# Patient Record
Sex: FEMALE | Race: WHITE | Employment: OTHER | URBAN - METROPOLITAN AREA
[De-identification: names, ages, dates, MRNs, and addresses within clinical notes are randomized per-mention and may not be internally consistent; named-entity substitution may affect disease eponyms.]

---

## 2017-02-14 ENCOUNTER — OFFICE VISIT (OUTPATIENT)
Dept: SURGERY | Facility: CLINIC | Age: 52
End: 2017-02-14

## 2017-02-14 VITALS
HEART RATE: 54 BPM | SYSTOLIC BLOOD PRESSURE: 117 MMHG | HEIGHT: 66.5 IN | WEIGHT: 161 LBS | DIASTOLIC BLOOD PRESSURE: 72 MMHG | BODY MASS INDEX: 25.57 KG/M2 | TEMPERATURE: 98 F

## 2017-02-14 DIAGNOSIS — Z90.13 H/O BILATERAL MASTECTOMY: Primary | ICD-10-CM

## 2017-02-14 PROCEDURE — 99212 OFFICE O/P EST SF 10 MIN: CPT | Performed by: SURGERY

## 2017-02-14 NOTE — PROGRESS NOTES
Michelle Avendaño is a 49year old female who presents today for a follow-up. Since her last visit, the patient underwent bilateral breast MRI. This showed postsurgical changes and no evidence of implant rupture.   The patient continues to complain of an a

## 2019-02-25 ENCOUNTER — TELEPHONE (OUTPATIENT)
Dept: SURGERY | Facility: CLINIC | Age: 54
End: 2019-02-25

## 2019-02-25 NOTE — TELEPHONE ENCOUNTER
I spoke with the patient who called with questions for Dr. Pat Steven-   She relates a surgical history with Dr. Luis Enrique Elizabeth and currently has bilateral breast, textured, implants.    She is anxious to hear the news that these implants are being \"banned in [place]\"

## 2019-04-02 ENCOUNTER — OFFICE VISIT (OUTPATIENT)
Dept: SURGERY | Facility: CLINIC | Age: 54
End: 2019-04-02
Payer: COMMERCIAL

## 2019-04-02 DIAGNOSIS — Z15.09 BRCA1 POSITIVE: Primary | ICD-10-CM

## 2019-04-02 DIAGNOSIS — Z15.01 BRCA1 POSITIVE: Primary | ICD-10-CM

## 2019-04-02 PROCEDURE — 99212 OFFICE O/P EST SF 10 MIN: CPT | Performed by: SURGERY

## 2019-04-02 NOTE — PROGRESS NOTES
Padmaja Duvall is a 47year old female who presents today for a follow-up. She complains of persistent tightness of her left reconstructed breast.  She denies any new masses, skin changes, or nipple discharge.   The patient has multiple questions regardi

## 2019-11-25 ENCOUNTER — TELEPHONE (OUTPATIENT)
Dept: SURGERY | Facility: CLINIC | Age: 54
End: 2019-11-25

## 2019-11-25 NOTE — TELEPHONE ENCOUNTER
Patient calling to see if we can hold a date for her surgery. She is seeing Dr Berkley Novak on 1/7 for a pre-op. Offered to hold a date in March. Patient stated she is going out of country and opted for a date in April instead.  Placing a tentative hold on 4/8 at

## 2020-01-07 ENCOUNTER — OFFICE VISIT (OUTPATIENT)
Dept: SURGERY | Facility: CLINIC | Age: 55
End: 2020-01-07
Payer: COMMERCIAL

## 2020-01-07 DIAGNOSIS — Z01.818 PREOP EXAMINATION: Primary | ICD-10-CM

## 2020-01-07 DIAGNOSIS — Z90.13 H/O BILATERAL MASTECTOMY: ICD-10-CM

## 2020-01-07 PROCEDURE — 99212 OFFICE O/P EST SF 10 MIN: CPT | Performed by: SURGERY

## 2020-01-07 NOTE — PATIENT INSTRUCTIONS
Surgeon:         Dr. Brenda Skiff                                        Tel:        [phone]                                  Fax:        [phone]    Surgery/Procedure:     Bilateral breast implant exchange, possible capsulectomy/capsulotomy of th

## 2020-01-07 NOTE — PROGRESS NOTES
Rell Phelan is a 47year old female who presents today for a follow-up. She complains of persistent tightness of her left reconstructed breast.  She would like to proceed with revision surgery.  She denies any new masses, skin changes, or nipple disch to proceed.

## 2020-01-20 ENCOUNTER — TELEPHONE (OUTPATIENT)
Dept: SURGERY | Facility: CLINIC | Age: 55
End: 2020-01-20

## 2020-01-20 DIAGNOSIS — Z90.13 H/O BILATERAL MASTECTOMY: Primary | ICD-10-CM

## 2020-01-20 NOTE — TELEPHONE ENCOUNTER
Returning pt's call, she stated that she is scheduled for 4/8/2020 with Dr. Bud Mari and wanted to make sure everything was authorized with her insurance.  I let her know that I wouldn't start her auth until the month before and that if there were any issues

## 2020-03-16 PROBLEM — R00.1 SINUS BRADYCARDIA: Status: ACTIVE | Noted: 2020-03-16

## 2020-03-19 ENCOUNTER — TELEPHONE (OUTPATIENT)
Dept: SURGERY | Facility: CLINIC | Age: 55
End: 2020-03-19

## 2020-03-19 DIAGNOSIS — Z15.09 BRCA1 POSITIVE: ICD-10-CM

## 2020-03-19 DIAGNOSIS — Z15.01 BRCA1 POSITIVE: ICD-10-CM

## 2020-03-19 DIAGNOSIS — Z90.13 H/O BILATERAL MASTECTOMY: Primary | ICD-10-CM

## 2020-03-19 NOTE — TELEPHONE ENCOUNTER
Called patient to inform her we must cancel her upcoming surgery due to COVID-19 restrictions. Informed patient we are still unsure as to when we can resume scheduling elective cases but will contact when we have more information.  Patient verbalized under

## 2020-06-03 ENCOUNTER — TELEPHONE (OUTPATIENT)
Dept: SURGERY | Facility: CLINIC | Age: 55
End: 2020-06-03

## 2020-06-03 DIAGNOSIS — Z90.13 STATUS POST BILATERAL MASTECTOMY: Primary | ICD-10-CM

## 2020-06-03 NOTE — TELEPHONE ENCOUNTER
Called pt to reschedule her surgery with Dr. Sissy Woods, pt wanted to make sure it was after her daughter started college. We confirmed 8/27/2020 at [facility]  Pt had questions regarding clearance for which I told her I would have the RN take a loo

## 2020-06-04 ENCOUNTER — TELEPHONE (OUTPATIENT)
Dept: SURGERY | Facility: CLINIC | Age: 55
End: 2020-06-04

## 2020-06-04 NOTE — TELEPHONE ENCOUNTER
Called patient after receiving a message stating she had pre-op clearance testing questions. I informed her that her EKG was normal and within the 6 month time frame of her surgery so she does not need to repeat.  She will need a new medical clearance and l

## 2020-08-24 ENCOUNTER — APPOINTMENT (OUTPATIENT)
Dept: LAB | Facility: HOSPITAL | Age: 55
End: 2020-08-24
Attending: SURGERY
Payer: COMMERCIAL

## 2020-08-24 DIAGNOSIS — Z01.818 PRE-OP TESTING: ICD-10-CM

## 2020-08-26 LAB — SARS-COV-2 RNA RESP QL NAA+PROBE: NOT DETECTED

## 2020-08-27 ENCOUNTER — ANESTHESIA (OUTPATIENT)
Dept: SURGERY | Facility: HOSPITAL | Age: 55
End: 2020-08-27
Payer: COMMERCIAL

## 2020-08-27 ENCOUNTER — HOSPITAL ENCOUNTER (OUTPATIENT)
Facility: HOSPITAL | Age: 55
Setting detail: HOSPITAL OUTPATIENT SURGERY
Discharge: HOME | End: 2020-08-27
Attending: SURGERY | Admitting: SURGERY
Payer: COMMERCIAL

## 2020-08-27 ENCOUNTER — ANESTHESIA EVENT (OUTPATIENT)
Dept: SURGERY | Facility: HOSPITAL | Age: 55
End: 2020-08-27
Payer: COMMERCIAL

## 2020-08-27 VITALS
RESPIRATION RATE: 15 BRPM | DIASTOLIC BLOOD PRESSURE: 68 MMHG | OXYGEN SATURATION: 97 % | HEART RATE: 51 BPM | BODY MASS INDEX: 26.33 KG/M2 | HEIGHT: 66 IN | SYSTOLIC BLOOD PRESSURE: 112 MMHG | TEMPERATURE: 98 F | WEIGHT: 163.81 LBS

## 2020-08-27 DIAGNOSIS — Z90.13 STATUS POST BILATERAL MASTECTOMY: ICD-10-CM

## 2020-08-27 DIAGNOSIS — Z01.818 PRE-OP TESTING: Primary | ICD-10-CM

## 2020-08-27 PROCEDURE — 0HQU0ZZ REPAIR LEFT BREAST, OPEN APPROACH: ICD-10-PCS | Performed by: SURGERY

## 2020-08-27 PROCEDURE — 88305 TISSUE EXAM BY PATHOLOGIST: CPT | Performed by: SURGERY

## 2020-08-27 PROCEDURE — 0HRV0JZ REPLACEMENT OF BILATERAL BREAST WITH SYNTHETIC SUBSTITUTE, OPEN APPROACH: ICD-10-PCS | Performed by: SURGERY

## 2020-08-27 DEVICE — IMPLNT BRST INSPIRA FULL 605CC: Type: IMPLANTABLE DEVICE | Site: BREAST | Status: FUNCTIONAL

## 2020-08-27 RX ORDER — HYDROMORPHONE HYDROCHLORIDE 1 MG/ML
INJECTION, SOLUTION INTRAMUSCULAR; INTRAVENOUS; SUBCUTANEOUS
Status: COMPLETED
Start: 2020-08-27 | End: 2020-08-27

## 2020-08-27 RX ORDER — HYDROCODONE BITARTRATE AND ACETAMINOPHEN 5; 325 MG/1; MG/1
1 TABLET ORAL AS NEEDED
Status: DISCONTINUED | OUTPATIENT
Start: 2020-08-27 | End: 2020-08-27

## 2020-08-27 RX ORDER — ONDANSETRON 2 MG/ML
4 INJECTION INTRAMUSCULAR; INTRAVENOUS AS NEEDED
Status: DISCONTINUED | OUTPATIENT
Start: 2020-08-27 | End: 2020-08-27

## 2020-08-27 RX ORDER — LIDOCAINE HYDROCHLORIDE AND EPINEPHRINE 10; 10 MG/ML; UG/ML
INJECTION, SOLUTION INFILTRATION; PERINEURAL AS NEEDED
Status: DISCONTINUED | OUTPATIENT
Start: 2020-08-27 | End: 2020-08-27 | Stop reason: HOSPADM

## 2020-08-27 RX ORDER — ACETAMINOPHEN 500 MG
1000 TABLET ORAL ONCE
Status: DISCONTINUED | OUTPATIENT
Start: 2020-08-27 | End: 2020-08-27 | Stop reason: HOSPADM

## 2020-08-27 RX ORDER — LIDOCAINE HYDROCHLORIDE 10 MG/ML
INJECTION, SOLUTION EPIDURAL; INFILTRATION; INTRACAUDAL; PERINEURAL AS NEEDED
Status: DISCONTINUED | OUTPATIENT
Start: 2020-08-27 | End: 2020-08-27 | Stop reason: SURG

## 2020-08-27 RX ORDER — HYDROCODONE BITARTRATE AND ACETAMINOPHEN 5; 325 MG/1; MG/1
2 TABLET ORAL AS NEEDED
Status: DISCONTINUED | OUTPATIENT
Start: 2020-08-27 | End: 2020-08-27

## 2020-08-27 RX ORDER — ONDANSETRON 4 MG/1
4 TABLET, FILM COATED ORAL EVERY 8 HOURS PRN
Qty: 20 TABLET | Refills: 0 | Status: SHIPPED | OUTPATIENT
Start: 2020-08-27 | End: 2020-09-22 | Stop reason: ALTCHOICE

## 2020-08-27 RX ORDER — SODIUM CHLORIDE, SODIUM LACTATE, POTASSIUM CHLORIDE, CALCIUM CHLORIDE 600; 310; 30; 20 MG/100ML; MG/100ML; MG/100ML; MG/100ML
INJECTION, SOLUTION INTRAVENOUS CONTINUOUS
Status: DISCONTINUED | OUTPATIENT
Start: 2020-08-27 | End: 2020-08-27

## 2020-08-27 RX ORDER — HYDROCODONE BITARTRATE AND ACETAMINOPHEN 5; 325 MG/1; MG/1
1-2 TABLET ORAL EVERY 4 HOURS PRN
Qty: 40 TABLET | Refills: 0 | Status: SHIPPED | OUTPATIENT
Start: 2020-08-27 | End: 2020-09-22 | Stop reason: ALTCHOICE

## 2020-08-27 RX ORDER — ACETAMINOPHEN 500 MG
1000 TABLET ORAL ONCE
COMMUNITY
End: 2021-04-22

## 2020-08-27 RX ORDER — CEPHALEXIN 500 MG/1
500 CAPSULE ORAL 4 TIMES DAILY
Qty: 20 CAPSULE | Refills: 0 | Status: SHIPPED | OUTPATIENT
Start: 2020-08-27 | End: 2020-09-01

## 2020-08-27 RX ORDER — HYDROMORPHONE HYDROCHLORIDE 1 MG/ML
0.4 INJECTION, SOLUTION INTRAMUSCULAR; INTRAVENOUS; SUBCUTANEOUS EVERY 5 MIN PRN
Status: DISCONTINUED | OUTPATIENT
Start: 2020-08-27 | End: 2020-08-27

## 2020-08-27 RX ORDER — CEFAZOLIN SODIUM/WATER 2 G/20 ML
2 SYRINGE (ML) INTRAVENOUS ONCE
Status: COMPLETED | OUTPATIENT
Start: 2020-08-27 | End: 2020-08-27

## 2020-08-27 RX ORDER — NEOSTIGMINE METHYLSULFATE 1 MG/ML
INJECTION INTRAVENOUS AS NEEDED
Status: DISCONTINUED | OUTPATIENT
Start: 2020-08-27 | End: 2020-08-27 | Stop reason: SURG

## 2020-08-27 RX ORDER — ROCURONIUM BROMIDE 10 MG/ML
INJECTION, SOLUTION INTRAVENOUS AS NEEDED
Status: DISCONTINUED | OUTPATIENT
Start: 2020-08-27 | End: 2020-08-27 | Stop reason: SURG

## 2020-08-27 RX ORDER — NALOXONE HYDROCHLORIDE 0.4 MG/ML
80 INJECTION, SOLUTION INTRAMUSCULAR; INTRAVENOUS; SUBCUTANEOUS AS NEEDED
Status: DISCONTINUED | OUTPATIENT
Start: 2020-08-27 | End: 2020-08-27

## 2020-08-27 RX ORDER — METOCLOPRAMIDE HYDROCHLORIDE 5 MG/ML
10 INJECTION INTRAMUSCULAR; INTRAVENOUS AS NEEDED
Status: DISCONTINUED | OUTPATIENT
Start: 2020-08-27 | End: 2020-08-27

## 2020-08-27 RX ORDER — DOCUSATE SODIUM 100 MG/1
100 CAPSULE, LIQUID FILLED ORAL 2 TIMES DAILY
Qty: 40 CAPSULE | Refills: 0 | Status: SHIPPED | OUTPATIENT
Start: 2020-08-27 | End: 2020-09-22 | Stop reason: ALTCHOICE

## 2020-08-27 RX ORDER — GLYCOPYRROLATE 0.2 MG/ML
INJECTION, SOLUTION INTRAMUSCULAR; INTRAVENOUS AS NEEDED
Status: DISCONTINUED | OUTPATIENT
Start: 2020-08-27 | End: 2020-08-27 | Stop reason: SURG

## 2020-08-27 RX ORDER — DEXAMETHASONE SODIUM PHOSPHATE 4 MG/ML
VIAL (ML) INJECTION AS NEEDED
Status: DISCONTINUED | OUTPATIENT
Start: 2020-08-27 | End: 2020-08-27 | Stop reason: SURG

## 2020-08-27 RX ADMIN — DEXAMETHASONE SODIUM PHOSPHATE 4 MG: 4 MG/ML VIAL (ML) INJECTION at 15:56:00

## 2020-08-27 RX ADMIN — SODIUM CHLORIDE, SODIUM LACTATE, POTASSIUM CHLORIDE, CALCIUM CHLORIDE: 600; 310; 30; 20 INJECTION, SOLUTION INTRAVENOUS at 18:02:00

## 2020-08-27 RX ADMIN — LIDOCAINE HYDROCHLORIDE 25 MG: 10 INJECTION, SOLUTION EPIDURAL; INFILTRATION; INTRACAUDAL; PERINEURAL at 15:27:00

## 2020-08-27 RX ADMIN — GLYCOPYRROLATE 0.4 MG: 0.2 INJECTION, SOLUTION INTRAMUSCULAR; INTRAVENOUS at 17:37:00

## 2020-08-27 RX ADMIN — ROCURONIUM BROMIDE 30 MG: 10 INJECTION, SOLUTION INTRAVENOUS at 15:31:00

## 2020-08-27 RX ADMIN — SODIUM CHLORIDE, SODIUM LACTATE, POTASSIUM CHLORIDE, CALCIUM CHLORIDE: 600; 310; 30; 20 INJECTION, SOLUTION INTRAVENOUS at 16:08:00

## 2020-08-27 RX ADMIN — SODIUM CHLORIDE, SODIUM LACTATE, POTASSIUM CHLORIDE, CALCIUM CHLORIDE: 600; 310; 30; 20 INJECTION, SOLUTION INTRAVENOUS at 15:31:00

## 2020-08-27 RX ADMIN — CEFAZOLIN SODIUM/WATER 2 G: 2 G/20 ML SYRINGE (ML) INTRAVENOUS at 15:38:00

## 2020-08-27 RX ADMIN — SODIUM CHLORIDE, SODIUM LACTATE, POTASSIUM CHLORIDE, CALCIUM CHLORIDE: 600; 310; 30; 20 INJECTION, SOLUTION INTRAVENOUS at 17:33:00

## 2020-08-27 RX ADMIN — NEOSTIGMINE METHYLSULFATE 2 MG: 1 INJECTION INTRAVENOUS at 17:37:00

## 2020-08-27 NOTE — ANESTHESIA POSTPROCEDURE EVALUATION
[address] Patient Status:  Hospital Outpatient Surgery   Age/Gender 49year old female MRN [id number]   [facility] SURGERY Attending Mariella Gonzalez MD   Hosp Day # 0 PCP Socorro Bellamy MD       Anesthesia Post-

## 2020-08-27 NOTE — ANESTHESIA PREPROCEDURE EVALUATION
PRE-OP EVALUATION    Patient Name: Crista Campbell    Pre-op Diagnosis: Status post bilateral mastectomy [Z90.13]    Procedure(s):  Bilateral breast implant exchange, possible capsulectomy/capsulotomy of the left breast, possible revision mastopexy of MD at [facility] MAIN OR   • BREAST RECONSTRUCTION SECOND STAGE/ REVISION Bilateral 9/25/2013    Performed by Aly Kerns MD at [facility] MAIN OR   • BREAST RECONSTRUCTION/ IMMEDIATE/EXPANDER Bilateral 5/8/2013    Performed by Aly Kerns MD at [facility]   • ESEQUIEL Admission:  **None**

## 2020-08-27 NOTE — H&P
Dr. Jess De La Fuente 8/18/20 surgical clearance H&P reviewed. No interval changes. Informed consent obtained and she wishes to proceed as planned.

## 2020-08-27 NOTE — BRIEF OP NOTE
Pre-Operative Diagnosis: Status post bilateral mastectomy [Z90.13]     Post-Operative Diagnosis: Status post bilateral mastectomy [Z90.13]      Procedure Performed:   Procedure(s):  Bilateral breast implant exchange, capsulectomy/capsulotomy of the left br

## 2020-08-27 NOTE — ANESTHESIA PROCEDURE NOTES
Airway  Date/Time: 8/27/2020 3:35 PM  Urgency: elective    Airway not difficult    General Information and Staff    Patient location during procedure: OR  Anesthesiologist: Sonia Rao MD  Performed: anesthesiologist     Indications and Patient Condi

## 2020-08-28 NOTE — OPERATIVE REPORT
[facility]    PATIENT'S NAME: Sylvia Pedro   ATTENDING PHYSICIAN: Eliezer Schumacher M.D. OPERATING PHYSICIAN: Eliezer Schumacher M.D.    PATIENT ACCOUNT#:   [de-identified]    LOCATION:  [address] [place] [address] EDW 10  MEDICAL RECORD #:   [id number] lipodystrophy were marked for liposuction. The patient was then taken to the operating room, properly identified, placed in the supine position. Sequential compression devices were placed on bilateral lower extremities.   Intravenous antibiotic prophylaxi superior pole of the implant was overriding the pectoralis muscle rather than below it. The subpectoral pocket was then released with electrocautery superiorly and medially.   Laterally, the capsule was released to the markings that had previously been mad analysis. The wound was then closed in a layered fashion with interrupted 3-0 Vicryl deep sutures and running 4-0 Monocryl subcuticular suture. Dermabond and Steri-Strips were placed on all the incisions.   Fluff gauze and a surgical bra were placed on th

## 2020-09-04 ENCOUNTER — OFFICE VISIT (OUTPATIENT)
Dept: SURGERY | Facility: CLINIC | Age: 55
End: 2020-09-04
Payer: COMMERCIAL

## 2020-09-04 DIAGNOSIS — Z90.13 ABSENCE OF BREAST, BILATERAL: Primary | ICD-10-CM

## 2020-09-04 PROCEDURE — 99024 POSTOP FOLLOW-UP VISIT: CPT | Performed by: PHYSICIAN ASSISTANT

## 2020-09-04 NOTE — PROGRESS NOTES
This is a 41-year-old female that is 8 days status post her bilateral implant exchange with a left capsulotomy and mastopexy, autologous fat grafting to the bilateral reconstructed breasts. She has been compliant with compression.   She has finished her 5-

## 2020-09-22 ENCOUNTER — OFFICE VISIT (OUTPATIENT)
Dept: SURGERY | Facility: CLINIC | Age: 55
End: 2020-09-22
Payer: COMMERCIAL

## 2020-09-22 DIAGNOSIS — Z90.13 ABSENCE OF BREAST, BILATERAL: Primary | ICD-10-CM

## 2020-09-22 PROCEDURE — 99024 POSTOP FOLLOW-UP VISIT: CPT | Performed by: SURGERY

## 2020-09-22 NOTE — PROGRESS NOTES
Lexy Valente is a 49year old female who presents today for a follow-up. She is without new complaints and is pleased with her result. Physical Examination:  Breasts: Bilateral breast incisions are well-healed.   Good shape and symmetry is noted

## 2021-03-16 ENCOUNTER — OFFICE VISIT (OUTPATIENT)
Dept: SURGERY | Facility: CLINIC | Age: 56
End: 2021-03-16
Payer: COMMERCIAL

## 2021-03-16 DIAGNOSIS — Z90.13 ABSENCE OF BREAST, BILATERAL: Primary | ICD-10-CM

## 2021-03-16 PROCEDURE — 99212 OFFICE O/P EST SF 10 MIN: CPT | Performed by: SURGERY

## 2021-09-21 ENCOUNTER — OFFICE VISIT (OUTPATIENT)
Dept: SURGERY | Facility: CLINIC | Age: 56
End: 2021-09-21
Payer: COMMERCIAL

## 2021-09-21 DIAGNOSIS — Z90.13 ABSENCE OF BREAST, BILATERAL: Primary | ICD-10-CM

## 2021-09-21 PROCEDURE — 99212 OFFICE O/P EST SF 10 MIN: CPT | Performed by: SURGERY

## 2021-09-21 NOTE — PROGRESS NOTES
Clara Ordaz is a 48year old female who presents today for a yearly follow-up. She she is without new complaints. Specifically, she denies any masses, skin changes, or nipple discharge.       Physical Examination:  HEENT: There is no cervical or

## 2022-04-19 ENCOUNTER — HOSPITAL ENCOUNTER (OUTPATIENT)
Dept: CV DIAGNOSTICS | Facility: HOSPITAL | Age: 57
Discharge: HOME | End: 2022-04-19
Attending: INTERNAL MEDICINE
Payer: COMMERCIAL

## 2022-04-19 DIAGNOSIS — R00.1 SINUS BRADYCARDIA: ICD-10-CM

## 2022-04-19 PROCEDURE — 93306 TTE W/DOPPLER COMPLETE: CPT | Performed by: INTERNAL MEDICINE

## 2024-03-13 ENCOUNTER — ORDER TRANSCRIPTION (OUTPATIENT)
Dept: ADMINISTRATIVE | Facility: HOSPITAL | Age: 59
End: 2024-03-13

## 2024-03-13 DIAGNOSIS — Z13.6 SCREENING FOR CARDIOVASCULAR CONDITION: Primary | ICD-10-CM

## 2024-05-06 ENCOUNTER — HOSPITAL ENCOUNTER (OUTPATIENT)
Dept: CT IMAGING | Age: 59
Discharge: HOME | End: 2024-05-06
Attending: FAMILY MEDICINE

## 2024-05-06 DIAGNOSIS — Z13.6 SCREENING FOR CARDIOVASCULAR CONDITION: ICD-10-CM

## 2024-08-09 ENCOUNTER — OFFICE VISIT (OUTPATIENT)
Dept: RHEUMATOLOGY | Facility: CLINIC | Age: 59
End: 2024-08-09
Payer: COMMERCIAL

## 2024-08-09 ENCOUNTER — LAB ENCOUNTER (OUTPATIENT)
Dept: LAB | Age: 59
End: 2024-08-09
Attending: INTERNAL MEDICINE
Payer: COMMERCIAL

## 2024-08-09 VITALS
BODY MASS INDEX: 26.74 KG/M2 | HEIGHT: 66 IN | HEART RATE: 95 BPM | DIASTOLIC BLOOD PRESSURE: 80 MMHG | OXYGEN SATURATION: 94 % | WEIGHT: 166.38 LBS | TEMPERATURE: 97 F | RESPIRATION RATE: 16 BRPM | SYSTOLIC BLOOD PRESSURE: 122 MMHG

## 2024-08-09 DIAGNOSIS — R76.8 POSITIVE ANA (ANTINUCLEAR ANTIBODY): Primary | ICD-10-CM

## 2024-08-09 DIAGNOSIS — Z78.0 POST-MENOPAUSAL: ICD-10-CM

## 2024-08-09 DIAGNOSIS — L65.9 ALOPECIA: ICD-10-CM

## 2024-08-09 DIAGNOSIS — Z15.09 BRCA GENE POSITIVE: ICD-10-CM

## 2024-08-09 DIAGNOSIS — Z15.01 BRCA GENE POSITIVE: ICD-10-CM

## 2024-08-09 DIAGNOSIS — M85.80 OSTEOPENIA, UNSPECIFIED LOCATION: ICD-10-CM

## 2024-08-09 DIAGNOSIS — Z13.820 SCREENING FOR OSTEOPOROSIS: ICD-10-CM

## 2024-08-09 DIAGNOSIS — R76.8 POSITIVE ANA (ANTINUCLEAR ANTIBODY): ICD-10-CM

## 2024-08-09 LAB
BILIRUB UR QL STRIP.AUTO: NEGATIVE
C3 SERPL-MCNC: 143.5 MG/DL (ref 90–170)
C4 SERPL-MCNC: 32.3 MG/DL (ref 12–36)
CLARITY UR REFRACT.AUTO: CLEAR
CREAT UR-SCNC: 70 MG/DL
CRP SERPL-MCNC: <0.4 MG/DL (ref ?–0.5)
DEPRECATED HBV CORE AB SER IA-ACNC: 81.3 NG/ML
ERYTHROCYTE [SEDIMENTATION RATE] IN BLOOD: 15 MM/HR
GLUCOSE UR STRIP.AUTO-MCNC: NORMAL MG/DL
IRON SATN MFR SERPL: 18 %
IRON SERPL-MCNC: 60 UG/DL
KETONES UR STRIP.AUTO-MCNC: NEGATIVE MG/DL
LEUKOCYTE ESTERASE UR QL STRIP.AUTO: 250
NITRITE UR QL STRIP.AUTO: NEGATIVE
PH UR STRIP.AUTO: 7 [PH] (ref 5–8)
PROT UR STRIP.AUTO-MCNC: NEGATIVE MG/DL
PROT UR-MCNC: 7.5 MG/DL (ref ?–14)
RBC UR QL AUTO: NEGATIVE
RHEUMATOID FACT SERPL-ACNC: <3.5 IU/ML (ref ?–14)
SP GR UR STRIP.AUTO: 1.01 (ref 1–1.03)
TOTAL IRON BINDING CAPACITY: 338 UG/DL (ref 250–425)
TRANSFERRIN SERPL-MCNC: 246 MG/DL (ref 250–380)
TSI SER-ACNC: 0.93 MIU/ML (ref 0.55–4.78)
UROBILINOGEN UR STRIP.AUTO-MCNC: NORMAL MG/DL
VIT B12 SERPL-MCNC: 548 PG/ML (ref 211–911)

## 2024-08-09 PROCEDURE — 86431 RHEUMATOID FACTOR QUANT: CPT | Performed by: INTERNAL MEDICINE

## 2024-08-09 PROCEDURE — 86140 C-REACTIVE PROTEIN: CPT | Performed by: INTERNAL MEDICINE

## 2024-08-09 PROCEDURE — 81001 URINALYSIS AUTO W/SCOPE: CPT | Performed by: INTERNAL MEDICINE

## 2024-08-09 PROCEDURE — 85613 RUSSELL VIPER VENOM DILUTED: CPT

## 2024-08-09 PROCEDURE — 85610 PROTHROMBIN TIME: CPT

## 2024-08-09 PROCEDURE — 86200 CCP ANTIBODY: CPT | Performed by: INTERNAL MEDICINE

## 2024-08-09 PROCEDURE — 84156 ASSAY OF PROTEIN URINE: CPT | Performed by: INTERNAL MEDICINE

## 2024-08-09 PROCEDURE — 86160 COMPLEMENT ANTIGEN: CPT | Performed by: INTERNAL MEDICINE

## 2024-08-09 PROCEDURE — 86039 ANTINUCLEAR ANTIBODIES (ANA): CPT

## 2024-08-09 PROCEDURE — 83516 IMMUNOASSAY NONANTIBODY: CPT | Performed by: INTERNAL MEDICINE

## 2024-08-09 PROCEDURE — 86038 ANTINUCLEAR ANTIBODIES: CPT

## 2024-08-09 PROCEDURE — 86147 CARDIOLIPIN ANTIBODY EA IG: CPT | Performed by: INTERNAL MEDICINE

## 2024-08-09 PROCEDURE — 86225 DNA ANTIBODY NATIVE: CPT

## 2024-08-09 PROCEDURE — 99204 OFFICE O/P NEW MOD 45 MIN: CPT | Performed by: INTERNAL MEDICINE

## 2024-08-09 PROCEDURE — 86235 NUCLEAR ANTIGEN ANTIBODY: CPT | Performed by: INTERNAL MEDICINE

## 2024-08-09 PROCEDURE — 84443 ASSAY THYROID STIM HORMONE: CPT | Performed by: INTERNAL MEDICINE

## 2024-08-09 PROCEDURE — 82728 ASSAY OF FERRITIN: CPT | Performed by: INTERNAL MEDICINE

## 2024-08-09 PROCEDURE — 85652 RBC SED RATE AUTOMATED: CPT | Performed by: INTERNAL MEDICINE

## 2024-08-09 PROCEDURE — 86146 BETA-2 GLYCOPROTEIN ANTIBODY: CPT | Performed by: INTERNAL MEDICINE

## 2024-08-09 PROCEDURE — 85732 THROMBOPLASTIN TIME PARTIAL: CPT

## 2024-08-09 PROCEDURE — 87086 URINE CULTURE/COLONY COUNT: CPT | Performed by: INTERNAL MEDICINE

## 2024-08-09 PROCEDURE — 82570 ASSAY OF URINE CREATININE: CPT | Performed by: INTERNAL MEDICINE

## 2024-08-09 PROCEDURE — 82607 VITAMIN B-12: CPT | Performed by: INTERNAL MEDICINE

## 2024-08-09 PROCEDURE — 83540 ASSAY OF IRON: CPT | Performed by: INTERNAL MEDICINE

## 2024-08-09 PROCEDURE — 83550 IRON BINDING TEST: CPT | Performed by: INTERNAL MEDICINE

## 2024-08-09 PROCEDURE — 85705 THROMBOPLASTIN INHIBITION: CPT

## 2024-08-09 NOTE — PROGRESS NOTES
Rheumatology New Patient Note  =====================================================================================================    Date of visit: 8/9/2024  ?  Chief complaint: +CAITLIN  Chief Complaint   Patient presents with    New Patient     New patient rapid 3 score is a 0.     ?  Referring (will send letter)  Dr. Quinones    PCP  Timoteo Whelan MD  Fax: [phone]  Phone: [phone]    =====================================================================================================  HPI    Abelino Ortiz is a 58 year old female     Here for evaluation of positive CAITLIN  Feb 2024: sick with flu-like symptoms.  Unclear exact etiology.  March 2024: Lost over 90% of her scalp hair.  Also lost much of her body hair.  Eyebrow hair remain unaffected.  -Saw dermatology.  Low vitamin B12 noted.  Started B12 supplement.  Given intralesional Kenalog.  Her slowly regrew and became quite thick again.  -At the end of July 2024, patient suffered acute thinning of her hair once again.  -derm thinking about alopecia universalis per patient report    BRCA1: Due to BRCA mutation, patient underwent medication induced premature ovarian failure and double mastectomy.     Denies current malar rash, photosensitivity rash, discoid lesions, oral/nasal ulcers, pleuritic chest pain, arthritis, seizures/psychosis, Raynaud's, dry eyes/mouth, or blood clots.    Denies miscarriages or obstetric events (early pre-eclampsia, IUGR, placental insufficiency)  -Prior pregnancies and/or children: 3 children  --Pregnancy complications: none      14 point ROS negative except noted above    Medications:  Current Outpatient Medications on File Prior to Visit   Medication Sig Dispense Refill    Multiple Vitamin (MULTIVITAMIN ADULT OR) Take by mouth. OTC with biotin and nutraful       No current facility-administered medications on file prior to visit.       Past Medical History:  Past Medical History:    BRCA1 gene mutation positive     PONV (postoperative nausea and vomiting)    Tennis elbow    Left/ resolved     Past Surgical History:  Past Surgical History:   Procedure Laterality Date      1996/1997/2002    x3    Colonoscopy  2018    diverticulosis, repeat 10 yrs    Laser surgery of eye      LASIK PROCEDURE    Mastectomy left  2013    Mastectomy right  2013    Other  2013    bilat breast reconstruction    Other surgical history  18 yo    Foot SurgeryL    Other surgical history  14    BSO    Revise breast reconstruction Left 2016    Revise breast reconstruction Left 20136    Tubal ligation      with 3rd Csection     Family History:  Family History   Problem Relation Age of Onset    Heart Disorder Father         MI - 39, smoker    Cancer Mother         breast CA - 35, Ovarian CA    Hypertension Mother     Breast Cancer Mother 35        dx at age 35    Other (Other) Mother     Heart Disorder Maternal Grandfather         CAD, MIx6    Cancer Paternal Grandfather         Esophageal CA    Obesity Brother      Social History:  Social History     Socioeconomic History    Marital status:    Tobacco Use    Smoking status: Never    Smokeless tobacco: Never   Vaping Use    Vaping status: Never Used   Substance and Sexual Activity    Alcohol use: Yes     Comment: 2-3 drinks per week    Drug use: No     ?  Allergies:  No Known Allergies      Objective    Vitals:    24 0914   BP: 122/80   Pulse: 95   Resp: 16   Temp: 97.2 °F (36.2 °C)   SpO2: 94%   Weight: 166 lb 6.4 oz (75.5 kg)   Height: 5' 6\" (1.676 m)       GEN: NAD, well-nourished.   HEENT: Head: NCAT. Face: No lesions. Eyes: Conjunctiva clear. Sclera are anicteric. PERRLA. EOMs are full. Ears: The right and left ear canals are clear.  Nose: No external or internal nasal deformities. Nasal septum is midline. Mouth: The lips are within normal limits.  No oral ulcers Tongue is midline with no lesions. The oral cavity is clear.   Neck: Supple. No neck masses. No  thyromegaly. No LAD, parotid or submandicular gland palpated.   CV: RRR, no mrg, S1/S2  PULM: CTAB, no wrr, easy effort  Extremities: No cyanosis, edema or deformities.   Neurologic: Strength, CN2-12 grossly intact   Psych: normal affect.   Skin: Diffuse significant nonscarring alopecia with 30% coverage remaining.  MSK: 28 joint count performed. No evidence of synovitis in mcp, pip, dip, wrist, elbows, shoulders, hips, knees, ankles, mtp unless otherwise noted. Full ROM of elbows, wrists, knees.    Hands- No ulceration/lesions noted. No swelling in IPJs, no TTP or synovitis noted in joints of hand   Wrists- No pain with wrist flexion and extension. No swelling, erythema, or increased warmth.   Elbows- No swelling, erythema, or increased warmth.   Shoulders- FROM, abduction ~180 degrees bilaterally.    Knees- No swelling, erythema, or increased warmth. AROM flexion/extension ~0-180 degrees.    No valgus/varus laxities appreciated.   Ankles/Feet- No swelling, erythema, or increased warmth.      ?  Labs:      March 25th 2024  CAITLIN 1:80  B12 195, folate wnl  Zinc wnl  Testosterone  Vit D 111    4/2024  DHEA negative  CBC W differential WNL  Ferritin WNL  Cortisol, TSH WNL  Creatinine 0.56, rest of CMP WNL    Lab Results   Component Value Date    WBC 4.99 08/18/2020    RBC 4.85 08/18/2020    HGB 13.8 08/18/2020    HCT 42.7 08/18/2020     08/18/2020    MCV 88.0 08/18/2020    MCH 28.5 08/18/2020    MCHC 32.3 08/18/2020    RDW 12.7 08/18/2020    NEPERCENT 56.9 08/18/2020    LYPERCENT 31.9 08/18/2020    MOPERCENT 9.0 08/18/2020    EOPERCENT 1.8 08/18/2020    BAPERCENT 0.4 08/18/2020    NE 2.84 08/18/2020    LYMABS 1.59 08/18/2020    MOABSO 0.45 08/18/2020    EOABSO 0.09 08/18/2020    BAABSO 0.02 08/18/2020     Lab Results   Component Value Date    GLU 91 08/18/2020    BUN 16.0 08/18/2020    BUNCREA 20.0 08/18/2020    CREATSERUM 0.82 08/18/2020    GFR >59 02/12/2010    CA 9.5 08/18/2020    ALKPHO 93 08/18/2020    AST  32 08/18/2020    ALT 33 08/18/2020    BILT 0.34 08/18/2020    TP 7.4 08/18/2020    ALB 4.5 08/18/2020    AGRATIO 2.3 11/25/2014     08/18/2020    K 4.25 08/18/2020     08/18/2020    CO2 28.5 08/18/2020         No results found for: \"ANATI\", \"CAITLIN\", \"ANAS\", \"ANASCRN\", \"ANASCRNRFLX\", \"KATHERYN\"  No results found for: \"SSA\", \"SSAUR\", \"ANTISSA\", \"SSA52\", \"SSA60\", \"SSADD\", \"SSB\", \"ANTISSB\"  No results found for: \"DSDNA\", \"ANTIDSDNA\", \"SMUD\", \"ANTISM\", \"SM\", \"RNP\", \"ANTIRNP\", \"SMITHRNP\"  No results found for: \"SCL70\", \"SCL\", \"DLAVFWI74\"  Lab Results   Component Value Date    C3 143.5 08/09/2024    C4 32.3 08/09/2024     No results found for: \"DRVVT\", \"LAINT\", \"PTTLUPUS\", \"LUPUSINTERP\", \"LA\", \"K7CB8SWEDV\", \"H7OY4ZUYWE\", \"M8SJNETQUG\", \"I2MGTDFFVC\"  No results found for: \"CARDIOLIPIGG\", \"CARDIOLIPIGM\", \"CARDIOLIPIGA\", \"CARDIOIGA\", \"CARLIP\"      Additional Labs:    Radiology:    Radiology review:      =====================================================================================================  Assessment and Plan    Assessment:  1. Positive CAITLIN (antinuclear antibody)    2. Screening for osteoporosis    3. Post-menopausal    4. Alopecia    5. BRCA gene positive    6. Osteopenia, unspecified location      Positive CAITLIN without any extracutaneous clinical features to suggest a systemic autoimmune disease such as lupus, Sjogren's, myositis.  -Patient's acute severe alopecia noted in March 2024 gradual regrowth of hair with subsequent recurrence of hair loss in July 2024 may be multifactorial: B12 deficiency,?  Telogen effluvium, ? Alopecia universalis (lost most of body hair)    #BRCA mutation: Under went double mastectomy and induced menopause in 2013  #Osteopenia: In 2014    Plan:  -DEXA to reassess osteopenia  -Further positive CAITLIN workup including antiphospholipid antibodies, complements, inflammatory markers, RF/CCP, CAITLIN by extractable nuclear antigens, CAITLIN by IFA, myositis panel  -Repeat B12 level.  Add on  iron studies.  Repeat TSH  -Patient continues to follow-up with dermatology.  She will be requesting repeat intralesional Kenalog injections.  Hopefully this will be the last recudescence of her alopecia.    Rtc prn    Diagnoses and all orders for this visit:    Positive CAITLIN (antinuclear antibody)  -     Lupus Anticoagulant Comp; Future  -     Beta 2 Glycoprotein I AB, G/M  -     Anticardiolipin Ab, IGG, Qn  -     Anticardiolipin AB, IGM, Qn  -     Complement C3, Serum  -     Complement C4, Serum  -     C-Reactive Protein  -     Sed Rate, Westergren (Automated)  -     Urinalysis with Culture Reflex  -     Cyclic Citrullinate Pep. IGG  -     Rheumatoid Arthritis Factor  -     Protein,Total,Urine, Random  -     Creatinine, Urine, Random  -     Connective Tissue Disease (CAITLIN) Screen, Reflex Specific Antibody; Future  -     Myositis Antibody Comprehensive Panel  -     Iron And Tibc  -     Ferritin  -     Vitamin B12  -     Anti-Nuclear Antibody (CAITLIN) by IFA Screen, Reflex Titer; Future  -     XR DEXA BONE DENSITOMETRY (CPT=77080); Future  -     TSH W Reflex To Free T4  -     Urine Culture, Routine    Screening for osteoporosis  -     Lupus Anticoagulant Comp; Future  -     Beta 2 Glycoprotein I AB, G/M  -     Anticardiolipin Ab, IGG, Qn  -     Anticardiolipin AB, IGM, Qn  -     Complement C3, Serum  -     Complement C4, Serum  -     C-Reactive Protein  -     Sed Rate, Westergren (Automated)  -     Urinalysis with Culture Reflex  -     Cyclic Citrullinate Pep. IGG  -     Rheumatoid Arthritis Factor  -     Protein,Total,Urine, Random  -     Creatinine, Urine, Random  -     Connective Tissue Disease (CAITLIN) Screen, Reflex Specific Antibody; Future  -     Myositis Antibody Comprehensive Panel  -     Iron And Tibc  -     Ferritin  -     Vitamin B12  -     Anti-Nuclear Antibody (CAITLIN) by IFA Screen, Reflex Titer; Future  -     XR DEXA BONE DENSITOMETRY (CPT=77080); Future  -     TSH W Reflex To Free T4  -     Urine Culture,  Routine    Post-menopausal  -     Lupus Anticoagulant Comp; Future  -     Beta 2 Glycoprotein I AB, G/M  -     Anticardiolipin Ab, IGG, Qn  -     Anticardiolipin AB, IGM, Qn  -     Complement C3, Serum  -     Complement C4, Serum  -     C-Reactive Protein  -     Sed Rate, Westergren (Automated)  -     Urinalysis with Culture Reflex  -     Cyclic Citrullinate Pep. IGG  -     Rheumatoid Arthritis Factor  -     Protein,Total,Urine, Random  -     Creatinine, Urine, Random  -     Connective Tissue Disease (CAITLIN) Screen, Reflex Specific Antibody; Future  -     Myositis Antibody Comprehensive Panel  -     Iron And Tibc  -     Ferritin  -     Vitamin B12  -     Anti-Nuclear Antibody (CAITLIN) by IFA Screen, Reflex Titer; Future  -     XR DEXA BONE DENSITOMETRY (CPT=77080); Future  -     TSH W Reflex To Free T4  -     Urine Culture, Routine    Alopecia  -     Lupus Anticoagulant Comp; Future  -     Beta 2 Glycoprotein I AB, G/M  -     Anticardiolipin Ab, IGG, Qn  -     Anticardiolipin AB, IGM, Qn  -     Complement C3, Serum  -     Complement C4, Serum  -     C-Reactive Protein  -     Sed Rate, Westergren (Automated)  -     Urinalysis with Culture Reflex  -     Cyclic Citrullinate Pep. IGG  -     Rheumatoid Arthritis Factor  -     Protein,Total,Urine, Random  -     Creatinine, Urine, Random  -     Connective Tissue Disease (CAITLIN) Screen, Reflex Specific Antibody; Future  -     Myositis Antibody Comprehensive Panel  -     Iron And Tibc  -     Ferritin  -     Vitamin B12  -     Anti-Nuclear Antibody (CAITLIN) by IFA Screen, Reflex Titer; Future  -     TSH W Reflex To Free T4  -     Urine Culture, Routine    BRCA gene positive    Osteopenia, unspecified location  -     XR DEXA BONE DENSITOMETRY (CPT=77080); Future        No follow-ups on file.      The above plan of care, diagnosis, orders, and follow-up were discussed with the patient. Questions related to this recommended plan of care were answered.    Thank you for referring this  delightful patient to me. Please feel free to contact me with any questions.     This report was performed utilizing speech recognition software technology. Despite proofreading, speech recognition errors could escape detection. If a word or phrase is confusing or out of context, please do not hesitate to call for   clarification.       Kind regards      Charles Sahu MD  [facility]

## 2024-08-12 LAB
ANA NUCLEOLAR TITR SER IF: 160 {TITER}
B2 GLYCOPROT1 IGG SERPL IA-ACNC: 1.4 U/ML (ref ?–7)
B2 GLYCOPROT1 IGM SERPL IA-ACNC: <2.4 U/ML (ref ?–7)
CARDIOLIPIN IGG SERPL-ACNC: 1.5 GPL (ref ?–10)
CARDIOLIPIN IGM SERPL-ACNC: <0.9 MPL (ref ?–10)
CCP IGG SERPL-ACNC: 1.3 U/ML (ref 0–6.9)
DSDNA IGG SERPL IA-ACNC: 1.1 IU/ML
ENA AB SER QL IA: 0.2 UG/L
ENA AB SER QL IA: NEGATIVE
NUCLEAR IGG TITR SER IF: POSITIVE {TITER}

## 2024-08-13 LAB
APTT PPP: 40.4 SECONDS (ref 23–36)
INR BLD: 0.94 (ref 0.85–1.16)
LA 3 SCREEN W REFLEX-IMP: NEGATIVE
PROTHROMBIN TIME: 12.5 SECONDS (ref 11.6–14.8)
SCREEN DRVVT: 1.03 S (ref 0–1.29)
SCREEN DRVVT: NEGATIVE S
STACLOT LA DELTA: 1.1 SECONDS (ref ?–8)

## 2024-08-20 ENCOUNTER — PATIENT MESSAGE (OUTPATIENT)
Dept: RHEUMATOLOGY | Facility: CLINIC | Age: 59
End: 2024-08-20

## 2024-08-20 DIAGNOSIS — R76.8 POSITIVE ANA (ANTINUCLEAR ANTIBODY): Primary | ICD-10-CM

## 2024-08-20 NOTE — TELEPHONE ENCOUNTER
From: Abelino Ortiz  To: Charles Sahu  Sent: 8/20/2024 1:30 PM CDT  Subject: Thyroid Antibody Panel - ??    Gabriel Sahu - me again. I saw my dermatologist, Dr. Quinones today and we were discussing my recent occurrence of hair loss. She asked if I have had a \"full thyroid antibody panel\". I know you had run lots of test but am just wondering if the \"full antibody panel\" was one of them. She was asking about Hasimotos or thyroid other autoimmune diseases. She saw that my TSH is normal but there still remains concern for autoimmune thyroid disease which could lead to some hair loss. She was unable to request this test, so thought I should ask you or my GP. Can you advise on if this was included in any of the test you have done and if you are able to request this test or do I need to ask my GP.    Thank you in advance for any insight. Just trying to get some sort of answer to know if I

## 2024-09-03 LAB
ANTI-EJ: NEGATIVE
ANTI-JO-1: <20 UNITS
ANTI-KU: NEGATIVE
ANTI-MDA-5: <20 UNITS
ANTI-MI-2 AB: NEGATIVE
ANTI-NXP-2: <20 UNITS
ANTI-OJ: NEGATIVE
ANTI-PL-12: NEGATIVE
ANTI-PL-7: NEGATIVE
ANTI-PM/SCL100: <20 UNITS
ANTI-SAE1: <20 UNITS
ANTI-SRP AB: NEGATIVE
ANTI-SSA 52KD IGG: <20 UNITS
ANTI-TIF-1GAMMA: <20 UNITS
ANTI-U1 RNP: <20 UNITS
ANTI-U2 RNP: NEGATIVE
ANTI-U3 RNP: NEGATIVE

## 2024-09-04 ENCOUNTER — LAB ENCOUNTER (OUTPATIENT)
Dept: LAB | Age: 59
End: 2024-09-04
Attending: INTERNAL MEDICINE
Payer: COMMERCIAL

## 2024-09-04 ENCOUNTER — HOSPITAL ENCOUNTER (OUTPATIENT)
Dept: BONE DENSITY | Age: 59
Discharge: HOME | End: 2024-09-04
Attending: INTERNAL MEDICINE
Payer: COMMERCIAL

## 2024-09-04 DIAGNOSIS — Z78.0 POST-MENOPAUSAL: ICD-10-CM

## 2024-09-04 DIAGNOSIS — R76.8 POSITIVE ANA (ANTINUCLEAR ANTIBODY): ICD-10-CM

## 2024-09-04 DIAGNOSIS — Z13.820 SCREENING FOR OSTEOPOROSIS: ICD-10-CM

## 2024-09-04 DIAGNOSIS — M85.80 OSTEOPENIA, UNSPECIFIED LOCATION: ICD-10-CM

## 2024-09-04 LAB
THYROGLOB SERPL-MCNC: <15 U/ML (ref ?–60)
THYROPEROXIDASE AB SERPL-ACNC: 34 U/ML (ref ?–60)

## 2024-09-04 PROCEDURE — 86800 THYROGLOBULIN ANTIBODY: CPT | Performed by: INTERNAL MEDICINE

## 2024-09-04 PROCEDURE — 77080 DXA BONE DENSITY AXIAL: CPT | Performed by: INTERNAL MEDICINE

## 2024-09-04 PROCEDURE — 86376 MICROSOMAL ANTIBODY EACH: CPT | Performed by: INTERNAL MEDICINE

## 2025-05-27 ENCOUNTER — TELEPHONE (OUTPATIENT)
Dept: SURGERY | Facility: CLINIC | Age: 60
End: 2025-05-27

## 2025-05-27 NOTE — TELEPHONE ENCOUNTER
Patient contacted the office requesting an appointment for implant monitoring imaging. Made patient aware we ask implant patients come in on an annual basis going forward. Patient verbalized understanding. PSRs will call patient back to schedule appointment.

## 2025-07-15 ENCOUNTER — OFFICE VISIT (OUTPATIENT)
Dept: SURGERY | Facility: CLINIC | Age: 60
End: 2025-07-15
Payer: COMMERCIAL

## 2025-07-15 VITALS
SYSTOLIC BLOOD PRESSURE: 132 MMHG | HEART RATE: 51 BPM | HEIGHT: 65.75 IN | TEMPERATURE: 99 F | WEIGHT: 167.19 LBS | RESPIRATION RATE: 16 BRPM | DIASTOLIC BLOOD PRESSURE: 78 MMHG | OXYGEN SATURATION: 98 % | BODY MASS INDEX: 27.19 KG/M2

## 2025-07-15 DIAGNOSIS — Z90.13 ABSENCE OF BREAST, BILATERAL: Primary | ICD-10-CM

## 2025-07-15 PROCEDURE — 99202 OFFICE O/P NEW SF 15 MIN: CPT

## 2025-07-15 RX ORDER — ALENDRONATE SODIUM 35 MG/1
35 TABLET ORAL
COMMUNITY

## 2025-07-15 RX ORDER — MAGNESIUM OXIDE/MAG AA CHELATE 300 MG
CAPSULE ORAL
COMMUNITY

## 2025-07-15 NOTE — PROGRESS NOTES
New Patient Consultation    Chief Complaint: Breast implant monitoring    History of Present Illness:   Abelino Ortiz is a 59 year old female who was last seen in the office by Dr. Mojica in 9/21/2021. She has a history of bilateral mastectomy with implant reconstruction. She underwent a right breast implant exchange, left breast implant exchange (style SSF Natrelle Inspira Soft Touch breast implant, 605 mL), left breast capsulotomy, autologous fat grafting of bilateral reconstructed breast, left breast crescentic mastopexy with Dr. Mojica on 8/27/2020.    She presents today for breast implant monitoring.  She has not had any imaging since surgery.  She denies any palpable masses, lesions, skin changes, skin dimpling, or nipple discharge.  She states that she is happy with the shape and size of her breast implants.  She does wear a supportive bra.  She is not interested in any revision surgery at this time.    Past Medical History:      Past Medical History[1]      Past Surgical History:  Past Surgical History[2]      Medications:    Current Medications[3]      Allergies:    Allergies[4]      Family History:   Family History[5]      Social History:  History   Alcohol Use    Yes     Comment: 2-3 drinks per week       History   Smoking Status    Never   Smokeless Tobacco    Never       History   Drug Use No       Review of Systems:    A 13 point review of systems was performed on the intake sheet.  General:   The patient denies, fever, chills, night sweats, fatigue, generalized weakness, change in appetite, weight loss, or weight gain.  Endocrine:   There is no history of poor/slow wound healing, weight loss/gain, fertility or hormone problems, cold intolerance, heat intolerance, excessive thirst, or thyroid disease.   Allergic/Immunologic:  There is no history of hives, hay fever, angioedema or anaphylaxis.  HEENT:    The patient denies ear pain, ear drainage, hearing loss, change in vision, double vision,  cataracts, glaucoma, nasal congestion, nosebleed, hoarseness, sore throat, or swollen glands  Respiratory:   The patient denies shortness of breath, cough, bloody cough, phlegm, asthma, or wheezing  Cardiovascular:  The patient denies chest pain/pressure, palpitations, irregular heartbeat, high blood pressure, stroke, or leg swelling  Breasts:  Patient denies breast masses, pain, change in the breast skin, skin dimpling, nipple discharge, or rash  Gastrointestinal:   There is no history of difficulty or pain with swallowing, reflux symptoms, nausea, vomiting, dark/ bloody stools, diarrhea, constipation,  change in bowel habits, or abdominal pain.   Genitourinary:  The patient denies frequent urination, needing to get up at night to urinate, urinary hesitancy or retaining urine, painful urination, urinary incontinence, decreased urine stream, blood in the urine, or vaginal/penile discharge.  Skin:   The patient denies rash, itching, skin lesions, dry skin, change in skin color or change in moles, sunburns, or sunburns with blistering.   Hematologic/Lymphatic:  The patient denies easily bruising or bleeding, persistent swollen glands or lymph nodes, bleeding disorders, blood clots, or pulmonary embolism.   Gynecologic:  The patient denies irregular menses, pelvic pain, pain with intercourse, painful menses, or pregnancy  Musculoskeletal:  The patient denies muscle aches/pain, joint pain, stiff joints, neck pain, back pain or bone pain.  Neurologic:  There is no history of migraines or severe headaches, seizure/epilepsy, speech problems, coordination problems, trembling/tremors, fainting/black outs, dizziness, memory problems, loss of sensation/numbness, problems walking, weakness, tingling or burning in hands/feet.   Psychiatric:  There is no history of abusive relationship, bipolar disorder, sleep disturbance, anxiety, depression or feeling of despair.    Physical Exam:    There were no vitals taken for this  visit.    Constitutional: The patient is an alert, oriented and well-developed.     Neurologic: Speech patterns and movements are normal.     Psychiatric: Affect is appropriate.    Eyes: Conjunctiva are clear, non-icteric. PERRL    ENT: no obvious abnormality, no ear drainage, mucous membranes moist and pink    Integument/Skin: The skin appears normal. There are no suspicious appearing rashes or lesions.    Breasts: no masses, no skin changes, no nipple discharge, no erythema, no ecchymosis, bilateral breast implants with acceptable shape and symmetry,     Respiratory: Normal respiratory effort.     Cardiovascular: no cyanosis, no edema    Lymphatic:  There is no cervical, supraclavicular, or axillary lymphadenopathy appreciated.    Musculoskeletal: Extremities unremarkable, without edema, tenderness or deformities    Impression:   Abelino Ortiz  is a 59 year old     Discussion and Plan:    Patient is doing well.  We discussed that her exam is normal today.  We reviewed the recommended FDA surveillance protocol for silicone implants.  An order for an MRI for her breast implants have been placed for her. She was encouraged to schedule this at her earliest convenience. We discussed continuing regular self breast examination with a plan for follow-up in 1 year or sooner if any changes are detected.  We reviewed reasons to contact our office.  The plan was reviewed with the patient and questions were answered.    20 minutes were spent with the patient, from which 15 minutes were spent counseling the patient and coordinating care.    The 21st Century Cures Act makes medical notes like these available to patients in the interest of transparency. Please be advised this is a medical document. Medical documents are intended to carry relevant information, facts as evident, and the clinical opinion of the practitioner. The medical note is intended as peer to peer communication and may appear blunt or direct. It is  written in medical language and may contain abbreviations or verbiage that are unfamiliar.     Aysha NIÑO, APRN  7/15/2025  1:46 PM         [1]   Past Medical History:   Alopecia    BRCA1 gene mutation positive    Osteopenia    PONV (postoperative nausea and vomiting)    Tennis elbow    Left/ resolved   [2]   Past Surgical History:  Procedure Laterality Date      1996/1997/2002    x3    Colonoscopy  2018    diverticulosis, repeat 10 yrs    Laser surgery of eye      LASIK PROCEDURE    Mastectomy left  2013    Mastectomy right  2013    Other  2013    bilat breast reconstruction    Other surgical history  18 yo    Foot SurgeryL    Other surgical history  13    BSO    Revise breast reconstruction Left 2016    Revise breast reconstruction Left 20136    Tubal ligation      with 3rd Csection   [3]    alendronate 35 MG Oral Tab Take 1 tablet (35 mg total) by mouth every 7 days.      Magnesium 300 MG Oral Cap Take by mouth.      Multiple Vitamin (MULTIVITAMIN ADULT OR) Take by mouth. OTC with biotin and nutraful     [4] No Known Allergies  [5]   Family History  Problem Relation Age of Onset    Cancer Mother         breast CA - 35, Ovarian CA    Hypertension Mother     Breast Cancer Mother 35        dx at age 35    Other (Other) Mother     Dementia Mother     Heart Disorder Father         MI - 39, smoker    Obesity Brother     Heart Disorder Maternal Grandfather         CAD, MIx6    Cancer Paternal Grandfather         Esophageal CA

## 2025-08-29 ENCOUNTER — TELEPHONE (OUTPATIENT)
Dept: SURGERY | Facility: CLINIC | Age: 60
End: 2025-08-29

## (undated) DEVICE — STERILE POLYISOPRENE POWDER-FREE SURGICAL GLOVES: Brand: PROTEXIS

## (undated) DEVICE — 3M™ STERI-STRIP™ REINFORCED ADHESIVE SKIN CLOSURES, R1548, 1 IN X 5 IN (25 MM X 125 MM), 4 STRIPS/ENVELOPE: Brand: 3M™ STERI-STRIP™

## (undated) DEVICE — CAUTERY BLADE 2IN INS E1455

## (undated) DEVICE — SYRINGE 5ML LL TIP

## (undated) DEVICE — PROXIMATE RH ROTATING HEAD SKIN STAPLERS (35 WIDE) CONTAINS 35 STAINLESS STEEL STAPLES: Brand: PROXIMATE

## (undated) DEVICE — CG INFILTRATION TUBING: Brand: CG INFILTRATION TUBING

## (undated) DEVICE — MARKER SKIN PREP RESIST STRL

## (undated) DEVICE — SUTURE MONOCRYL 4-0 PS-2

## (undated) DEVICE — SOL  .9 1000ML BTL

## (undated) DEVICE — DERMABOND LIQUID ADHESIVE

## (undated) DEVICE — BULB SYRINGE,IRRIGATION WITH PROTECTIVE CAP: Brand: DOVER

## (undated) DEVICE — SUTURE VICRYL 0 CT-1

## (undated) DEVICE — PLASTIC BREAST CDS-LF: Brand: MEDLINE INDUSTRIES, INC.

## (undated) DEVICE — BRA SURGICAL ELIZABETH PINK L

## (undated) DEVICE — 1010 S-DRAPE TOWEL DRAPE 10/BX: Brand: STERI-DRAPE™

## (undated) DEVICE — 40580 - THE PINK PAD - ADVANCED TRENDELENBURG POSITIONING KIT: Brand: 40580 - THE PINK PAD - ADVANCED TRENDELENBURG POSITIONING KIT

## (undated) DEVICE — KENDALL SCD EXPRESS SLEEVES, KNEE LENGTH, MEDIUM: Brand: KENDALL SCD

## (undated) DEVICE — VIOLET BRAIDED (POLYGLACTIN 910), SYNTHETIC ABSORBABLE SUTURE: Brand: COATED VICRYL

## (undated) DEVICE — PSI-TEC TUBING: Brand: PSI-TEC TUBING

## (undated) DEVICE — PLASTC TOOMEY SYRNG DISP

## (undated) DEVICE — 3M™ IOBAN™ 2 ANTIMICROBIAL INCISE DRAPE 6648EZ: Brand: IOBAN™ 2

## (undated) DEVICE — SIZER BREAST IMPLANT FP 605CC

## (undated) DEVICE — SUTURE VICRYL 3-0 SH

## (undated) DEVICE — SUPER SPONGES,MEDIUM: Brand: KERLIX

## (undated) DEVICE — SUTURE PLAIN GUT 5-0 PC-1

## (undated) NOTE — LETTER
Ines Flaherty [address]  [address] [facility] [address] [facility]  Authorization for Surgical Operation and Procedure     Date:___________                                                                                                         Time:__________ screening of blood or blood products by collecting agencies, I may still be subject to ill effects as a result of receiving a blood transfusion and/or blood products.   The following are some, but not all, of the potential risks that can occur: fever and al status will be suspended while in the operating room, procedural suite, and during the recovery period unless otherwise explicitly stated by me (or a person authorized to consent on my behalf).  The surgeon or my attending physician will determine when the I understand that my anesthesiologist is not an employee or agent of [facility] or [facility]. He or she works for [facility] [employer], [employer].    2. As the patient asking for anesthesia services, I agree to:  a.  Allow the anesthesiologi back to help control pain during labor), include itching, low blood pressure, difficulty urinating, headache or slowing of the baby’s heart. Very rare risks include infection, bleeding, seizure, irregular heart rhythms and nerve injury.   7. [facility]

## (undated) NOTE — LETTER
Ines Flaherty [address]  [address] [facility] [address] [facility]  Authorization for Surgical Operation and Procedure     Date:___________                                                                                                         Time:__________ screening of blood or blood products by collecting agencies, I may still be subject to ill effects as a result of receiving a blood transfusion and/or blood products.   The following are some, but not all, of the potential risks that can occur: fever and al status will be suspended while in the operating room, procedural suite, and during the recovery period unless otherwise explicitly stated by me (or a person authorized to consent on my behalf).  The surgeon or my attending physician will determine when the I understand that my anesthesiologist is not an employee or agent of [facility] or [facility]. He or she works for [facility] [employer], [employer].    2. As the patient asking for anesthesia services, I agree to:  a.  Allow the anesthesiologi back to help control pain during labor), include itching, low blood pressure, difficulty urinating, headache or slowing of the baby’s heart. Very rare risks include infection, bleeding, seizure, irregular heart rhythms and nerve injury.   7. [facility]

## (undated) NOTE — MR AVS SNAPSHOT
[facility]  [address] [facility]                    After Visit Summary   2/14/2017    Bertrand Tao    MRN: [id number]           Visit Information        Provider Department Dept Phone    2/14